# Patient Record
Sex: FEMALE | Race: ASIAN | NOT HISPANIC OR LATINO
[De-identification: names, ages, dates, MRNs, and addresses within clinical notes are randomized per-mention and may not be internally consistent; named-entity substitution may affect disease eponyms.]

---

## 2017-05-10 PROBLEM — Z00.00 ENCOUNTER FOR PREVENTIVE HEALTH EXAMINATION: Status: ACTIVE | Noted: 2017-05-10

## 2017-06-12 PROBLEM — Z82.49 FAMILY HISTORY OF HYPERTENSION: Status: ACTIVE | Noted: 2017-06-12

## 2017-06-12 PROBLEM — Z82.3 FAMILY HISTORY OF CEREBROVASCULAR ACCIDENT (CVA): Status: ACTIVE | Noted: 2017-06-12

## 2017-06-13 ENCOUNTER — APPOINTMENT (OUTPATIENT)
Dept: THORACIC SURGERY | Facility: CLINIC | Age: 57
End: 2017-06-13

## 2017-06-13 VITALS
HEART RATE: 64 BPM | WEIGHT: 100 LBS | HEIGHT: 58.66 IN | TEMPERATURE: 97.8 F | SYSTOLIC BLOOD PRESSURE: 182 MMHG | BODY MASS INDEX: 20.43 KG/M2 | DIASTOLIC BLOOD PRESSURE: 83 MMHG | OXYGEN SATURATION: 100 %

## 2017-06-13 DIAGNOSIS — Z82.3 FAMILY HISTORY OF STROKE: ICD-10-CM

## 2017-06-13 DIAGNOSIS — Z82.49 FAMILY HISTORY OF ISCHEMIC HEART DISEASE AND OTHER DISEASES OF THE CIRCULATORY SYSTEM: ICD-10-CM

## 2019-06-18 ENCOUNTER — APPOINTMENT (OUTPATIENT)
Dept: THORACIC SURGERY | Facility: CLINIC | Age: 59
End: 2019-06-18
Payer: COMMERCIAL

## 2019-06-18 VITALS
BODY MASS INDEX: 21.25 KG/M2 | DIASTOLIC BLOOD PRESSURE: 72 MMHG | SYSTOLIC BLOOD PRESSURE: 161 MMHG | RESPIRATION RATE: 17 BRPM | HEART RATE: 67 BPM | OXYGEN SATURATION: 100 % | TEMPERATURE: 97.7 F | WEIGHT: 104 LBS

## 2019-06-18 PROCEDURE — 99213 OFFICE O/P EST LOW 20 MIN: CPT

## 2019-06-21 NOTE — HISTORY OF PRESENT ILLNESS
[FreeTextEntry1] : 58 y/o F, never smoker, w/ hx of Lung CA.\par Now 10yrs s/p Lt VATS Lt Thoracotomy LULobectomy, MLND on 6/15/09. Path revealed AdenoCA, 3cm, pT1aN0 Stg IA.\par \par CT Chest on 6/9/17 showed a stable 3mm ill-defined RUL nodule; a 7mm RLL calcified granuloma; MIYA. \par \par CT Chest on 6/3/19:\par - a stable faint 3mm subpleural RUL nodule (series 3 image 24)\par - stable calcified RLL granuloma\par \par Patient is here today for a follow up. She reports feeling well and denies CP, cough, or SOB.

## 2019-06-21 NOTE — ASSESSMENT
[FreeTextEntry1] : 60 y/o F, never smoker, w/ hx of Lung CA.\par Now 10yrs s/p Lt VATS Lt Thoracotomy LULobectomy, MLND on 6/15/09. Path revealed AdenoCA, 3cm, pT1aN0 Stg IA.\par \par CT Chest on 6/3/19:\par - a stable faint 3mm subpleural RUL nodule (series 3 image 24)\par - stable calcified RLL granuloma\par \par I have reviewed the patient's medical records and diagnostic images at time of this office consultation and have made the following recommendation:\par 1. Current CT Chest shows MIYA, RTC in 2 years w/ CT Chest w/o contrast.\par \par \par Written by An Ariza NP, acting as a scribe for Dr. Arnold Gonzales.\par \par The documentation recorded by the scribe accurately reflects the service I personally performed and the decisions made by me. ARNOLD GONZALES MD\par

## 2019-06-21 NOTE — PHYSICAL EXAM
[Auscultation Breath Sounds / Voice Sounds] : lungs were clear to auscultation bilaterally [Heart Sounds] : normal S1 and S2 [Heart Rate And Rhythm] : heart rate was normal and rhythm regular [Heart Sounds Gallop] : no gallops [Murmurs] : no murmurs [Heart Sounds Pericardial Friction Rub] : no pericardial rub [Neck Appearance] : the appearance of the neck was normal [Examination Of The Chest] : the chest was normal in appearance [2+] : left 2+ [Bowel Sounds] : normal bowel sounds [Abdomen Soft] : soft [No CVA Tenderness] : no ~M costovertebral angle tenderness [Abnormal Walk] : normal gait [Involuntary Movements] : no involuntary movements were seen [Skin Color & Pigmentation] : normal skin color and pigmentation [Skin Turgor] : normal skin turgor [No Focal Deficits] : no focal deficits [] : no rash [Affect] : the affect was normal [Mood] : the mood was normal [Oriented To Time, Place, And Person] : oriented to person, place, and time [FreeTextEntry1] : deferred

## 2019-06-21 NOTE — CONSULT LETTER
[Consult Letter:] : I had the pleasure of evaluating your patient, [unfilled]. [Dear  ___] : Dear  [unfilled], [( Thank you for referring [unfilled] for consultation for _____ )] : Thank you for referring [unfilled] for consultation for [unfilled] [Please see my note below.] : Please see my note below. [Consult Closing:] : Thank you very much for allowing me to participate in the care of this patient.  If you have any questions, please do not hesitate to contact me. [Sincerely,] : Sincerely, [FreeTextEntry2] : Phyllis Salinas MD (PCP/Referring)  [FreeTextEntry3] : Arnold Gonzalez MD, MPH \par System Director of Thoracic Surgery \par Director of Comprehensive Lung and Foregut [place] \par Professor Cardiovascular & Thoracic Surgery  \par [facility] [employer] at [facility]\par

## 2019-06-21 NOTE — DATA REVIEWED
[FreeTextEntry1] : CT Chest on 6/3/19:\par - a stable faint 3mm subpleural RUL nodule (series 3 image 24)\par - stable calcified RLL granuloma

## 2021-06-24 ENCOUNTER — APPOINTMENT (OUTPATIENT)
Dept: THORACIC SURGERY | Facility: CLINIC | Age: 61
End: 2021-06-24
Payer: COMMERCIAL

## 2021-06-24 VITALS
OXYGEN SATURATION: 99 % | HEART RATE: 83 BPM | TEMPERATURE: 98.2 F | BODY MASS INDEX: 21.86 KG/M2 | RESPIRATION RATE: 18 BRPM | DIASTOLIC BLOOD PRESSURE: 82 MMHG | SYSTOLIC BLOOD PRESSURE: 159 MMHG | WEIGHT: 107 LBS

## 2021-06-24 PROCEDURE — 99213 OFFICE O/P EST LOW 20 MIN: CPT

## 2021-06-25 NOTE — ASSESSMENT
[FreeTextEntry1] : Ms. JAIME TURCIOS, 61 year old female, never smoker, w/ hx of Lung CA.\par \par Now 12 yrs s/p Lt VATS Lt Thoracotomy LULobectomy, MLND on 6/15/09. Path revealed AdenoCA, 3cm, pT1aN0 Stg IA.\par \par CT Chest on 6/16/21:\par - post-op changes with volume loss and mediastinal shift to the Lt\par - very faint 4mm RUL ggo, previously 3 mm (3:25)\par \par I have reviewed the patient's medical records and diagnostic images at time of this office consultation and have made the following recommendation:\par 1. CT chest reviewed and explained to patient, I recommended patient to return to office in 24 months with CT Chest without contrast.\par \par I personally performed the services described in the documentation, reviewed the documentation recorded by the scribe in my presence and it accurately and completely records my words and actions.\par \par I, Toshia Oneal NP, am scribing for and the presence of IDALIA Mcarthur, the following sections HISTORY OF PRESENT ILLNESS, PAST MEDICAL/FAMILY/SOCIAL HISTORY; REVIEW OF SYSTEMS; VITAL SIGNS; PHYSICAL EXAM; DISPOSITION.\par \par \par

## 2021-06-25 NOTE — CONSULT LETTER
[Dear  ___] : Dear  [unfilled], K+ 2.6 [Consult Letter:] : I had the pleasure of evaluating your patient, [unfilled]. [( Thank you for referring [unfilled] for consultation for _____ )] : Thank you for referring [unfilled] for consultation for [unfilled] [Please see my note below.] : Please see my note below. [Consult Closing:] : Thank you very much for allowing me to participate in the care of this patient.  If you have any questions, please do not hesitate to contact me. [Sincerely,] : Sincerely, [FreeTextEntry2] : Phyllis Salinas MD (PCP/Referring) [FreeTextEntry3] : Arnold Gonzalez MD, MPH \par System Director of Thoracic Surgery \par Director of Comprehensive Lung and Foregut [place] \par Professor Cardiovascular & Thoracic Surgery  \par [facility] [employer] at [facility]\par \par [facility]\par [address]\par Oncology [address]\[place] [address]\par Tel: [phone]\par Fax: [phone]\par

## 2021-06-25 NOTE — DATA REVIEWED
[FreeTextEntry1] : I have independently reviewed patient's CT on 6/16/21:\par CT Chest on 6/16/21:\par - post-op changes with volume loss and mediastinal shift to the Lt\par - very faint 4mm RUL ggo, previously 3mm (3:25)

## 2021-06-25 NOTE — HISTORY OF PRESENT ILLNESS
[FreeTextEntry1] : Ms. JAIME TURCIOS, 61 year old female, never smoker, w/ hx of Lung CA.\par \par Now 12 yrs s/p Lt VATS Lt Thoracotomy LULobectomy, MLND on 6/15/09. Path revealed AdenoCA, 3cm, pT1aN0 Stg IA.\par \par CT Chest on 6/3/19:\par - a stable faint 3mm subpleural RUL nodule (series 3 image 24)\par - stable calcified RLL granuloma\par \par CT Chest on 6/16/21:\par - post-op changes with volume loss and mediastinal shift to the Lt\par - very faint 4mm RUL ggo, previously 3 mm (3:25)\par \par Patient is here today for a follow up. Patient denies shortness of breath, cough, chest pain, fever, chills. \par \par

## 2023-06-08 ENCOUNTER — APPOINTMENT (OUTPATIENT)
Dept: THORACIC SURGERY | Facility: CLINIC | Age: 63
End: 2023-06-08
Payer: COMMERCIAL

## 2023-06-09 ENCOUNTER — APPOINTMENT (OUTPATIENT)
Dept: THORACIC SURGERY | Facility: CLINIC | Age: 63
End: 2023-06-09
Payer: COMMERCIAL

## 2023-06-09 DIAGNOSIS — C34.92 MALIGNANT NEOPLASM OF UNSPECIFIED PART OF LEFT BRONCHUS OR LUNG: ICD-10-CM

## 2023-06-09 PROCEDURE — 99443: CPT

## 2023-06-09 NOTE — REASON FOR VISIT
[Home] : at home, [unfilled] , at the time of the visit. [Medical Office: ([facility])___] : at the medical office located in  [Patient] : the patient [Self] : self [This encounter was initiated by telehealth (audio with video) and converted to telephone (audio only) due to technical difficulties.] : This encounter was initiated by telehealth (audio with video) and converted to telephone (audio only) due to technical difficulties.

## 2023-06-09 NOTE — ASSESSMENT
[FreeTextEntry1] : Ms. JAIME TURCIOS, 63 year old female, never smoker, w/ hx of Lung CA.\par \par Now 14 yrs s/p Lt VATS Lt Thoracotomy LULobectomy, MLND on 6/15/09. Path revealed AdenoCA, 3cm, pT1aN0 Stg IA.\par \par CT Chest on 6/1/23 at [facility]:\par - post-op changes \par - stable 3mm RUL nodule (5, 90)\par - persistent trace Lt pleural effusion and/or Lt pleural thickening\par - MIYA\par \par I have reviewed the patient's medical records and diagnostic images at time of this office consultation and have made the following recommendation:\par 1.  CT scan showed no evidence of recurrence, recommended patient to return to office in 2 yrs w/ CT Chest w/o contrast.\par \par \par \par

## 2023-06-09 NOTE — CONSULT LETTER
[FreeTextEntry2] : Phyllis Salinas MD (PCP/Referring) [FreeTextEntry3] : Arnold Gonzalez MD, MPH \par System Director of Thoracic Surgery \par Director of Comprehensive Lung and Foregut [place] \par Professor Cardiovascular & Thoracic Surgery  \par [facility] [employer] at [facility]\par \par [facility]\par [address]\par Oncology [address]\[place] [address]\par Tel: [phone]\par Fax: [phone]\par

## 2023-06-09 NOTE — HISTORY OF PRESENT ILLNESS
[FreeTextEntry1] : Ms. JAIME TURCIOS, 63 year old female, never smoker, w/ hx of Lung CA.\par \par Now 14 yrs s/p Lt VATS Lt Thoracotomy LULobectomy, MLND on 6/15/09. Path revealed AdenoCA, 3cm, pT1aN0 Stg IA.\par \par CT Chest on 6/3/19:\par - a stable faint 3mm subpleural RUL nodule (series 3 image 24)\par - stable calcified RLL granuloma\par \par CT Chest on 6/16/21:\par - post-op changes with volume loss and mediastinal shift to the Lt\par - very faint 4mm RUL ggo, previously 3 mm (3:25)\par \par CT Chest on 6/1/23 at [facility]:\par - post-op changes \par - stable 3mm RUL nodule (5, 90)\par - persistent trace Lt pleural effusion and/or Lt pleural thickening\par - MIYA\par \par Patient is followed today via Telehealth visit. Denies SOB, CP, cough.\par \par